# Patient Record
Sex: FEMALE | URBAN - METROPOLITAN AREA
[De-identification: names, ages, dates, MRNs, and addresses within clinical notes are randomized per-mention and may not be internally consistent; named-entity substitution may affect disease eponyms.]

---

## 2017-01-02 ENCOUNTER — TRANSFERRED RECORDS (OUTPATIENT)
Dept: HEALTH INFORMATION MANAGEMENT | Facility: CLINIC | Age: 22
End: 2017-01-02

## 2017-04-24 ENCOUNTER — TRANSFERRED RECORDS (OUTPATIENT)
Dept: HEALTH INFORMATION MANAGEMENT | Facility: CLINIC | Age: 22
End: 2017-04-24

## 2017-04-25 ENCOUNTER — TELEPHONE (OUTPATIENT)
Dept: NURSING | Facility: CLINIC | Age: 22
End: 2017-04-25

## 2017-04-25 NOTE — TELEPHONE ENCOUNTER
Call Type: Triage Call    Presenting Problem: Not seen yet in [facility] system. Made appt for 5/1. Was  on Depo Provera injection. Due for injection March 1 but did not get  it as she has decided not to continue with this form of birth  control. Pt c/o having period for 2 wks now. Like a regular period,  not heavy, no pain, no other sx. Asks if anything can be done for  this. Disc'd w/ pt she'd have to be seen for this within 2 wks to  discuss options. She will keep 5/1 appt. Will call back if pain,  heavy bleeding, dizziness or other sx.  Triage Note:  Guideline Title: Contraception: Injectable  Recommended Disposition: See Provider within 2 Weeks  Original Inclination: Wanted to speak with a nurse  Override Disposition:  Intended Action: See Dr/Piotr Appt  Physician Contacted: No  All other situations ?  YES  Missed menses AND previously having menses every month ? NO  Persistent or worsening acne ? NO  Breast symptoms ? NO  Requesting information on birth control ? NO  Leg pain ? NO  Irregular light bleeding AND evaluated by provider ? NO  Abdominal/pelvic pain ? NO  Mild vaginal bleeding or persistent vaginal spotting or scanty flow for more than  one month ? NO  Any chest pain or other cardiac symptoms ? NO  Any breathing difficulty ? NO  Headache and taking prescribed hormone therapy ? NO  Heavy bleeding or continuous bleeding ? NO  Emotional changes that interfere with ability to carry out normal activities ? NO  Requesting information on DMPA (Depo-Provera) ? NO  Requesting information on [place] ? NO  Physician Instructions:  Care Advice: Call provider if symptoms worsen or new symptoms develop.  CAUTIONS  HEALTH PROMOTION / MAINTENANCE  It is normal not to have periods once Depo-Provera use is begun, and if the  schedule of injections are consistently received.  A provider must be seen  if experience signs and symptoms of pregnancy (fetal movement, nausea and  vomiting  unusual breast tenderness, etc.).

## 2017-05-08 ENCOUNTER — OFFICE VISIT (OUTPATIENT)
Dept: FAMILY MEDICINE | Facility: CLINIC | Age: 22
End: 2017-05-08
Payer: COMMERCIAL

## 2017-05-08 VITALS
WEIGHT: 171.6 LBS | SYSTOLIC BLOOD PRESSURE: 126 MMHG | DIASTOLIC BLOOD PRESSURE: 86 MMHG | TEMPERATURE: 98.9 F | HEART RATE: 77 BPM | HEIGHT: 62 IN | BODY MASS INDEX: 31.58 KG/M2

## 2017-05-08 DIAGNOSIS — G43.009 MIGRAINE WITHOUT AURA AND WITHOUT STATUS MIGRAINOSUS, NOT INTRACTABLE: ICD-10-CM

## 2017-05-08 DIAGNOSIS — E03.9 ACQUIRED HYPOTHYROIDISM: Primary | ICD-10-CM

## 2017-05-08 DIAGNOSIS — F41.9 ANXIETY: ICD-10-CM

## 2017-05-08 DIAGNOSIS — Z30.09 GENERAL COUNSELING AND ADVICE FOR CONTRACEPTIVE MANAGEMENT: ICD-10-CM

## 2017-05-08 PROBLEM — E28.2 PCOS (POLYCYSTIC OVARIAN SYNDROME): Status: ACTIVE | Noted: 2017-05-08

## 2017-05-08 PROCEDURE — 99204 OFFICE O/P NEW MOD 45 MIN: CPT | Performed by: NURSE PRACTITIONER

## 2017-05-08 RX ORDER — FLUOXETINE 10 MG/1
CAPSULE ORAL
Qty: 60 CAPSULE | Refills: 1 | Status: SHIPPED | OUTPATIENT
Start: 2017-05-08

## 2017-05-08 RX ORDER — RIZATRIPTAN BENZOATE 10 MG/1
10 TABLET ORAL
Qty: 18 TABLET | Refills: 3 | Status: SHIPPED | OUTPATIENT
Start: 2017-05-08

## 2017-05-08 NOTE — PROGRESS NOTES
SUBJECTIVE:                                                    Gonzalo Momin is a 22 year old female who presents to clinic today for the following health issues:    Establish care - transferring from [facility]  Patient states that she was discharged from [facility] but didn't state why      Headache     Onset: 5 years ago    Description:   Location: bilateral in the temporal area   Character: throbbing pain  Frequency:  3 X week  Duration:  Up to 3 days    Intensity: severe    Progression of Symptoms:  same    Accompanying Signs & Symptoms:  Stiff neck: YES  Neck or upper back pain: YES  Fever: no   Sinus pressure: no   Nausea or vomiting: YES  Dizziness: YES  Numbness: no   Weakness: no   Visual changes: YES   History:   Head trauma: no   Family history of migraines: YES  Previous tests for headaches: YES  Neurologist evaluations: YES  Able to do daily activities: YES- depending on severity  Wake with a headaches: no   Do headaches wake you up: no   Daily pain medication use: no   Work/school stressors/changes: no     Precipitating factors:   Does light make it worse: YES  Does sound make it worse: YES    Alleviating factors:  Does sleep help: no         Therapies Tried and outcome: rizatriptan (works), imitrex (didn't work).    Has been out of medications for one year.        Patient wants to discuss Birth controll options  Previously on the OCP - made her migraines worse.  Then was on the Depo - made her much more emotional - cried all the time      Hypothyroid:  Diagnosed years ago.  Intolerant to levothyroxine and brand Synthroid - makes her vomiting.  Last was on [facility] Thyroid at 45 mg BID  Has been out for one year.    Anxiety:  Problem for years.  Previously on Zoloft - didn't work at all.  Has daily anxiety  Denies depression.  Wants to try a different medication.        Problem list and histories reviewed & adjusted, as indicated.  Additional history: as documented      Reviewed and updated as needed this  "visit by clinical staff  Tobacco  Allergies  Meds  Med Hx  Surg Hx  Fam Hx  Soc Hx      Reviewed and updated as needed this visit by Provider         ROS:  Constitutional, HEENT, cardiovascular, pulmonary, gi and gu systems are negative, except as otherwise noted.    OBJECTIVE:                                                    /86  Pulse 77  Temp 98.9  F (37.2  C) (Tympanic)  Ht 5' 2.25\" (1.581 m)  Wt 171 lb 9.6 oz (77.8 kg)  BMI 31.13 kg/m2  Body mass index is 31.13 kg/(m^2).  GENERAL: healthy, alert and no distress  HENT: ear canals and TM's normal, nose and mouth without ulcers or lesions  NECK: no adenopathy, no asymmetry, masses, or scars and thyroid normal to palpation  RESP: lungs clear to auscultation - no rales, rhonchi or wheezes  CV: regular rate and rhythm, normal S1 S2, no S3 or S4, no murmur, click or rub, no peripheral edema and peripheral pulses strong  ABDOMEN: soft, nontender, no hepatosplenomegaly, no masses and bowel sounds normal  MS: no gross musculoskeletal defects noted, no edema         ASSESSMENT/PLAN:                                                        ICD-10-CM    1. Acquired hypothyroidism E03.9 TSH with free T4 reflex - recheck today     2. Anxiety F41.9 Poorly controlled  Start FLUoxetine (PROZAC) 10 MG capsule  Follow up in one month.     3. Migraine without aura and without status migrainosus, not intractable G43.009 Poorly controlled  Restart rizatriptan (MAXALT) 10 MG tablet  Discussed preventative medication - she wants to hold off for now.     4. General counseling and advice for contraceptive management Z30.09 Options discussed.  She would like to pursue a non-hormonal option - interested in the copper IUD       Patient Instructions   Make appointment with Dr Adam for copper IUD.          Start fluoxetine for anxiety: One capsule daily for one week, then increase to two capsules daily    Discussed risks/bennefits and possible side effects of " antidepressants, including but not limited to GI upset, disrupted sleep, loss of libido, worsening of mood or even possible risk of increased suicidal thoughts.  These medications often take 4-6 weeks to be effective.    Also discussed the importance of using them for 6-9 months before stopping, to avoid rebound symptoms.   Contact the clinic if having any adverse effects.  Do not stop the medication abruptly.    Verbal contract for saftey discussed, patient should contact the clinic or 24 hour nurse line if any thoughts of self harm.    Follow up in one month or sooner if problems.          The risks, benefits and treatment options of prescribed medications or other treatments have been discussed with the patient. The patient verbalized their understanding and should call or follow up if no improvement or if they develop further problems.    DINA Cantu [facility]

## 2017-05-08 NOTE — PATIENT INSTRUCTIONS
Make appointment with Dr Adam for copper IUD.          Start fluoxetine for anxiety: One capsule daily for one week, then increase to two capsules daily    Discussed risks/bennefits and possible side effects of antidepressants, including but not limited to GI upset, disrupted sleep, loss of libido, worsening of mood or even possible risk of increased suicidal thoughts.  These medications often take 4-6 weeks to be effective.    Also discussed the importance of using them for 6-9 months before stopping, to avoid rebound symptoms.   Contact the clinic if having any adverse effects.  Do not stop the medication abruptly.    Verbal contract for edgar discussed, patient should contact the clinic or 24 hour nurse line if any thoughts of self harm.    Follow up in one month or sooner if problems.                Thank you for choosing [facility] [place].  You may be receiving a survey in the mail from Erin Dang regarding your visit today.  Please take a few minutes to complete and return the survey to let us know how we are doing.      If you have questions or concerns, please contact us via [employer] or you can contact your care team at [phone].    Our Clinic hours are:  Monday 6:40 am  to 7:00 pm  Tuesday -Friday 6:40 am to 5:00 pm    The [place] outpatient lab hours are:  Monday - Friday 6:10 am to 4:45 pm  Saturdays 7:00 am to 11:00 am  Appointments are required, call [phone]    If you have clinical questions after hours or would like to schedule an appointment,  call the clinic at [phone].

## 2017-05-08 NOTE — NURSING NOTE
"Chief Complaint   Patient presents with     Consult     discuss birth control options.     Headache       Initial /86  Pulse 77  Temp 98.9  F (37.2  C) (Tympanic)  Ht 5' 2.25\" (1.581 m)  Wt 171 lb 9.6 oz (77.8 kg)  BMI 31.13 kg/m2 Estimated body mass index is 31.13 kg/(m^2) as calculated from the following:    Height as of this encounter: 5' 2.25\" (1.581 m).    Weight as of this encounter: 171 lb 9.6 oz (77.8 kg).  Medication Reconciliation: complete   Desiree Lemons CMA      "

## 2017-05-08 NOTE — MR AVS SNAPSHOT
After Visit Summary   5/8/2017    Gonzalo Momin    MRN: [id number]           Patient Information     Date Of Birth          1995        Visit Information        Provider Department      5/8/2017 6:00 PM Zuleyma Riojas APRN [facility]        Today's Diagnoses     Acquired hypothyroidism    -  1    Anxiety        Migraine without aura and without status migrainosus, not intractable        General counseling and advice for contraceptive management          Care Instructions    Make appointment with Dr Adam for copper IUD.          Start fluoxetine for anxiety: One capsule daily for one week, then increase to two capsules daily    Discussed risks/bennefits and possible side effects of antidepressants, including but not limited to GI upset, disrupted sleep, loss of libido, worsening of mood or even possible risk of increased suicidal thoughts.  These medications often take 4-6 weeks to be effective.    Also discussed the importance of using them for 6-9 months before stopping, to avoid rebound symptoms.   Contact the clinic if having any adverse effects.  Do not stop the medication abruptly.    Verbal contract for edgar discussed, patient should contact the clinic or 24 hour nurse line if any thoughts of self harm.    Follow up in one month or sooner if problems.                Thank you for choosing [facility].  You may be receiving a survey in the mail from [employer] regarding your visit today.  Please take a few minutes to complete and return the survey to let us know how we are doing.      If you have questions or concerns, please contact us via [employer] or you can contact your care team at [phone].    Our Clinic hours are:  Monday 6:40 am  to 7:00 pm  Tuesday -Friday 6:40 am to 5:00 pm    The [place] outpatient lab hours are:  Monday - Friday 6:10 am to 4:45 pm  Saturdays 7:00 am to 11:00 am  Appointments are required, call [phone]    If you have  "clinical questions after hours or would like to schedule an appointment,  call the clinic at [phone].          Follow-ups after your visit        Who to contact     If you have questions or need follow up information about today's clinic visit or your schedule please contact [facility] directly at [phone].  Normal or non-critical lab and imaging results will be communicated to you by MyChart, letter or phone within 4 business days after the clinic has received the results. If you do not hear from us within 7 days, please contact the clinic through [employer] or phone. If you have a critical or abnormal lab result, we will notify you by phone as soon as possible.  Submit refill requests through [employer] or call your pharmacy and they will forward the refill request to us. Please allow 3 business days for your refill to be completed.          Additional Information About Your Visit        MyChart Information     [employer] lets you send messages to your doctor, view your test results, renew your prescriptions, schedule appointments and more. To sign up, go to [email or web address] . Click on \"Log in\" on the left side of the screen, which will take you to the Welcome page. Then click on \"Sign up Now\" on the right side of the page.     You will be asked to enter the access code listed below, as well as some personal information. Please follow the directions to create your username and password.     Your access code is: [email or web address]  Expires: 2017  6:22 PM     Your access code will  in 90 days. If you need help or a new code, please call your [place] clinic or [phone].        Care EveryWhere ID     This is your Care EveryWhere ID. This could be used by other organizations to access your [place] medical records  [id number]        Your Vitals Were     Pulse Temperature Height BMI (Body Mass Index)          77 98.9  F (37.2  C) (Tympanic) 5' 2.25\" (1.581 m) 31.13 kg/m2         Blood " Pressure from Last 3 Encounters:   05/08/17 126/86    Weight from Last 3 Encounters:   05/08/17 171 lb 9.6 oz (77.8 kg)              We Performed the Following     TSH with free T4 reflex          Today's Medication Changes          These changes are accurate as of: 5/8/17  6:22 PM.  If you have any questions, ask your nurse or doctor.               Start taking these medicines.        Dose/Directions    FLUoxetine 10 MG capsule   Commonly known as:  PROzac   Used for:  Anxiety   Started by:  Zuleyma Riojas APRN CNP        One capsule daily for one week, then increase to two capsules daily   Quantity:  60 capsule   Refills:  1       rizatriptan 10 MG tablet   Commonly known as:  MAXALT   Used for:  Migraine without aura and without status migrainosus, not intractable   Started by:  Zuleyma Riojas APRN CNP        Dose:  10 mg   Take 1 tablet (10 mg) by mouth at onset of headache for migraine May repeat in 2 hours. Max 3 tablets/24 hours.   Quantity:  18 tablet   Refills:  3            Where to get your medicines      These medications were sent to [id number] [place] - [address]  [address] [place] [address]     Phone:  [phone]     FLUoxetine 10 MG capsule    rizatriptan 10 MG tablet                Primary Care Provider    None Specified       No primary provider on file.        Thank you!     Thank you for choosing [facility]  for your care. Our goal is always to provide you with excellent care. Hearing back from our patients is one way we can continue to improve our services. Please take a few minutes to complete the written survey that you may receive in the mail after your visit with us. Thank you!             Your Updated Medication List - Protect others around you: Learn how to safely use, store and throw away your medicines at www.disposemymeds.org.          This list is accurate as of: 5/8/17  6:22 PM.  Always use your most recent med  list.                   Brand Name Dispense Instructions for use    EXCEDRIN MIGRAINE PO          FLUoxetine 10 MG capsule    PROzac    60 capsule    One capsule daily for one week, then increase to two capsules daily       rizatriptan 10 MG tablet    MAXALT    18 tablet    Take 1 tablet (10 mg) by mouth at onset of headache for migraine May repeat in 2 hours. Max 3 tablets/24 hours.

## 2017-05-09 ENCOUNTER — TELEPHONE (OUTPATIENT)
Dept: FAMILY MEDICINE | Facility: CLINIC | Age: 22
End: 2017-05-09

## 2017-05-09 NOTE — TELEPHONE ENCOUNTER
Patient was seen in clinic 5/8/17. Signed release to get records transferred from [facility]. [facility] faxed release back stating they did not have a patient that matches that name and date of birth in their records. I called and left message for patient to call back. When she calls back, need to ask here what [facility] she was seen at in the past , and she may have to come to Medical records dept to sign a new release of info. Thai CORBIN CMA

## 2017-05-09 NOTE — TELEPHONE ENCOUNTER
Pt calling back stating it might be under her maiden name Debi. I called [facility] and they found her and will fax what was requested by end of day.    I had them send records to our fax number of [phone]. Will give to provider when received.     [facility]

## 2017-07-15 DIAGNOSIS — F41.9 ANXIETY: Primary | ICD-10-CM

## 2017-07-17 NOTE — TELEPHONE ENCOUNTER
Fluoxetine    Last Written Prescription Date: 05/08/17  Last Fill Quantity: 60, # refills: 1  Last Office Visit with [facility] primary care provider:  05/08/17        Last PHQ-9 score on record= No flowsheet data found.

## 2017-12-01 DIAGNOSIS — E03.9 ACQUIRED HYPOTHYROIDISM: ICD-10-CM

## 2017-12-01 LAB
T4 FREE SERPL-MCNC: 0.68 NG/DL (ref 0.76–1.46)
TSH SERPL DL<=0.005 MIU/L-ACNC: 37.33 MU/L (ref 0.4–4)

## 2017-12-01 PROCEDURE — 84443 ASSAY THYROID STIM HORMONE: CPT | Performed by: NURSE PRACTITIONER

## 2017-12-01 PROCEDURE — 36415 COLL VENOUS BLD VENIPUNCTURE: CPT | Performed by: NURSE PRACTITIONER

## 2017-12-01 PROCEDURE — 84439 ASSAY OF FREE THYROXINE: CPT | Performed by: NURSE PRACTITIONER

## 2017-12-03 ENCOUNTER — MYC MEDICAL ADVICE (OUTPATIENT)
Dept: FAMILY MEDICINE | Facility: CLINIC | Age: 22
End: 2017-12-03

## 2017-12-05 ENCOUNTER — TELEPHONE (OUTPATIENT)
Dept: FAMILY MEDICINE | Facility: CLINIC | Age: 22
End: 2017-12-05

## 2017-12-05 NOTE — TELEPHONE ENCOUNTER
Advised her to set up appt to discuss further labs. Discussed lab results in general. Anupama Strauss RN

## 2017-12-05 NOTE — TELEPHONE ENCOUNTER
Reason for Call:  Other labs    Detailed comments: Patient would like RN to call back to discuss TSH free value. She would like to know what the value of 37.33 exactly means.    Phone Number Patient can be reached at: Home number on file [phone] (home)    Best Time: any    Can we leave a detailed message on this number? YES    Call taken on 12/5/2017 at 3:55 PM by Marilia Pedro

## 2018-01-21 ENCOUNTER — HEALTH MAINTENANCE LETTER (OUTPATIENT)
Age: 23
End: 2018-01-21

## 2018-08-16 ENCOUNTER — TELEPHONE (OUTPATIENT)
Dept: FAMILY MEDICINE | Facility: CLINIC | Age: 23
End: 2018-08-16

## 2018-08-16 NOTE — TELEPHONE ENCOUNTER
8/16/2018      Patient is scheduled for Physical w/Pap.             Outreach ,  Diane Francisco

## 2020-03-10 ENCOUNTER — HEALTH MAINTENANCE LETTER (OUTPATIENT)
Age: 25
End: 2020-03-10

## 2022-09-28 ENCOUNTER — LAB REQUISITION (OUTPATIENT)
Dept: LAB | Facility: CLINIC | Age: 27
End: 2022-09-28
Payer: COMMERCIAL

## 2022-09-28 DIAGNOSIS — Z86.39 PERSONAL HISTORY OF OTHER ENDOCRINE, NUTRITIONAL AND METABOLIC DISEASE: ICD-10-CM

## 2022-09-28 LAB
T4 FREE SERPL-MCNC: 0.71 NG/DL (ref 0.9–1.7)
TSH SERPL DL<=0.005 MIU/L-ACNC: 5.95 UIU/ML (ref 0.3–4.2)

## 2022-09-28 PROCEDURE — 84443 ASSAY THYROID STIM HORMONE: CPT | Performed by: ADVANCED PRACTICE MIDWIFE

## 2022-09-28 PROCEDURE — 84439 ASSAY OF FREE THYROXINE: CPT | Mod: ORL | Performed by: ADVANCED PRACTICE MIDWIFE

## 2022-11-23 ENCOUNTER — LAB REQUISITION (OUTPATIENT)
Dept: LAB | Facility: CLINIC | Age: 27
End: 2022-11-23
Payer: COMMERCIAL

## 2022-11-23 DIAGNOSIS — Z86.39 PERSONAL HISTORY OF OTHER ENDOCRINE, NUTRITIONAL AND METABOLIC DISEASE: ICD-10-CM

## 2022-11-23 LAB
T4 FREE SERPL-MCNC: 0.77 NG/DL (ref 0.9–1.7)
TSH SERPL DL<=0.005 MIU/L-ACNC: 0.88 UIU/ML (ref 0.3–4.2)

## 2022-11-23 PROCEDURE — 84439 ASSAY OF FREE THYROXINE: CPT | Mod: ORL | Performed by: ADVANCED PRACTICE MIDWIFE

## 2022-11-23 PROCEDURE — 84443 ASSAY THYROID STIM HORMONE: CPT | Mod: ORL | Performed by: ADVANCED PRACTICE MIDWIFE

## 2022-12-23 ENCOUNTER — LAB REQUISITION (OUTPATIENT)
Dept: LAB | Facility: CLINIC | Age: 27
End: 2022-12-23
Payer: COMMERCIAL

## 2022-12-23 DIAGNOSIS — E06.3 AUTOIMMUNE THYROIDITIS: ICD-10-CM

## 2022-12-23 DIAGNOSIS — Z36.9 ENCOUNTER FOR ANTENATAL SCREENING, UNSPECIFIED: ICD-10-CM

## 2022-12-23 LAB
T4 FREE SERPL-MCNC: 0.61 NG/DL (ref 0.9–1.7)
TSH SERPL DL<=0.005 MIU/L-ACNC: 19.4 UIU/ML (ref 0.3–4.2)

## 2022-12-23 PROCEDURE — 87653 STREP B DNA AMP PROBE: CPT | Mod: ORL | Performed by: ADVANCED PRACTICE MIDWIFE

## 2022-12-23 PROCEDURE — 84439 ASSAY OF FREE THYROXINE: CPT | Performed by: ADVANCED PRACTICE MIDWIFE

## 2022-12-23 PROCEDURE — 84443 ASSAY THYROID STIM HORMONE: CPT | Mod: ORL | Performed by: ADVANCED PRACTICE MIDWIFE

## 2022-12-24 LAB — GP B STREP DNA SPEC QL NAA+PROBE: POSITIVE

## 2023-03-08 ENCOUNTER — LAB REQUISITION (OUTPATIENT)
Dept: LAB | Facility: CLINIC | Age: 28
End: 2023-03-08
Payer: COMMERCIAL

## 2023-03-08 DIAGNOSIS — Z12.4 ENCOUNTER FOR SCREENING FOR MALIGNANT NEOPLASM OF CERVIX: ICD-10-CM

## 2023-03-08 DIAGNOSIS — E03.9 HYPOTHYROIDISM, UNSPECIFIED: ICD-10-CM

## 2023-03-08 LAB
T3FREE SERPL-MCNC: 6.2 PG/ML (ref 2–4.4)
T4 FREE SERPL-MCNC: 0.99 NG/DL (ref 0.9–1.7)
TSH SERPL DL<=0.005 MIU/L-ACNC: 0.38 UIU/ML (ref 0.3–4.2)

## 2023-03-08 PROCEDURE — 84439 ASSAY OF FREE THYROXINE: CPT | Mod: ORL | Performed by: ADVANCED PRACTICE MIDWIFE

## 2023-03-08 PROCEDURE — G0145 SCR C/V CYTO,THINLAYER,RESCR: HCPCS | Mod: ORL | Performed by: ADVANCED PRACTICE MIDWIFE

## 2023-03-08 PROCEDURE — 84481 FREE ASSAY (FT-3): CPT | Mod: ORL | Performed by: ADVANCED PRACTICE MIDWIFE

## 2023-03-08 PROCEDURE — 84443 ASSAY THYROID STIM HORMONE: CPT | Mod: ORL | Performed by: ADVANCED PRACTICE MIDWIFE

## 2023-03-10 LAB
BKR LAB AP GYN ADEQUACY: NORMAL
BKR LAB AP GYN INTERPRETATION: NORMAL
BKR LAB AP HPV REFLEX: NORMAL
BKR LAB AP LMP: NORMAL
BKR LAB AP PREVIOUS ABNL DX: NORMAL
BKR LAB AP PREVIOUS ABNORMAL: NORMAL
PATH REPORT.COMMENTS IMP SPEC: NORMAL
PATH REPORT.COMMENTS IMP SPEC: NORMAL
PATH REPORT.RELEVANT HX SPEC: NORMAL